# Patient Record
Sex: FEMALE | Race: WHITE | Employment: FULL TIME | URBAN - METROPOLITAN AREA
[De-identification: names, ages, dates, MRNs, and addresses within clinical notes are randomized per-mention and may not be internally consistent; named-entity substitution may affect disease eponyms.]

---

## 2021-03-24 ENCOUNTER — HISTORICAL (OUTPATIENT)
Dept: ADMINISTRATIVE | Facility: HOSPITAL | Age: 46
End: 2021-03-24

## 2021-03-24 LAB
ABS NEUT (OLG): 4.48 X10(3)/MCL (ref 2.1–9.2)
ALBUMIN SERPL-MCNC: 4.2 GM/DL (ref 3.5–5)
ALBUMIN/GLOB SERPL: 1.3 RATIO (ref 1.1–2)
ALP SERPL-CCNC: 115 UNIT/L (ref 40–150)
ALT SERPL-CCNC: 14 UNIT/L (ref 0–55)
AST SERPL-CCNC: 21 UNIT/L (ref 5–34)
BASOPHILS # BLD AUTO: 0.1 X10(3)/MCL (ref 0–0.2)
BASOPHILS NFR BLD AUTO: 1 %
BILIRUB SERPL-MCNC: 0.4 MG/DL
BILIRUBIN DIRECT+TOT PNL SERPL-MCNC: 0.1 MG/DL (ref 0–0.5)
BILIRUBIN DIRECT+TOT PNL SERPL-MCNC: 0.3 MG/DL (ref 0–0.8)
BUN SERPL-MCNC: 7.7 MG/DL (ref 7–18.7)
CALCIUM SERPL-MCNC: 9.6 MG/DL (ref 8.4–10.2)
CHLORIDE SERPL-SCNC: 104 MMOL/L (ref 98–107)
CO2 SERPL-SCNC: 28 MMOL/L (ref 22–29)
CREAT SERPL-MCNC: 0.84 MG/DL (ref 0.55–1.02)
EOSINOPHIL # BLD AUTO: 0.2 X10(3)/MCL (ref 0–0.9)
EOSINOPHIL NFR BLD AUTO: 3 %
ERYTHROCYTE [DISTWIDTH] IN BLOOD BY AUTOMATED COUNT: 13 % (ref 11.5–17)
GLOBULIN SER-MCNC: 3.2 GM/DL (ref 2.4–3.5)
GLUCOSE SERPL-MCNC: 83 MG/DL (ref 74–100)
HCT VFR BLD AUTO: 44.2 % (ref 37–47)
HGB BLD-MCNC: 14.4 GM/DL (ref 12–16)
LYMPHOCYTES # BLD AUTO: 1.2 X10(3)/MCL (ref 0.6–4.6)
LYMPHOCYTES NFR BLD AUTO: 18 %
MCH RBC QN AUTO: 30.5 PG (ref 27–31)
MCHC RBC AUTO-ENTMCNC: 32.6 GM/DL (ref 33–36)
MCV RBC AUTO: 93.6 FL (ref 80–94)
MONOCYTES # BLD AUTO: 0.6 X10(3)/MCL (ref 0.1–1.3)
MONOCYTES NFR BLD AUTO: 9 %
NEUTROPHILS # BLD AUTO: 4.48 X10(3)/MCL (ref 2.1–9.2)
NEUTROPHILS NFR BLD AUTO: 69 %
PLATELET # BLD AUTO: 392 X10(3)/MCL (ref 130–400)
PMV BLD AUTO: 9.9 FL (ref 9.4–12.4)
POTASSIUM SERPL-SCNC: 4.8 MMOL/L (ref 3.5–5.1)
PROT SERPL-MCNC: 7.4 GM/DL (ref 6.4–8.3)
RBC # BLD AUTO: 4.72 X10(6)/MCL (ref 4.2–5.4)
SODIUM SERPL-SCNC: 140 MMOL/L (ref 136–145)
WBC # SPEC AUTO: 6.5 X10(3)/MCL (ref 4.5–11.5)

## 2021-04-09 ENCOUNTER — HISTORICAL (OUTPATIENT)
Dept: RADIOLOGY | Facility: HOSPITAL | Age: 46
End: 2021-04-09

## 2021-04-09 LAB — SARS-COV-2 RNA RESP QL NAA+PROBE: NOT DETECTED

## 2021-04-14 ENCOUNTER — HISTORICAL (OUTPATIENT)
Dept: SURGERY | Facility: HOSPITAL | Age: 46
End: 2021-04-14

## 2021-04-28 ENCOUNTER — HISTORICAL (OUTPATIENT)
Dept: ADMINISTRATIVE | Facility: HOSPITAL | Age: 46
End: 2021-04-28

## 2021-05-02 LAB — EST CREAT CLEARANCE SER (OHS): 95.29 ML/MIN

## 2021-07-13 ENCOUNTER — HISTORICAL (OUTPATIENT)
Dept: SURGERY | Facility: HOSPITAL | Age: 46
End: 2021-07-13

## 2021-07-13 LAB — POC BETA-HCG (QUAL): NEGATIVE

## 2021-07-14 LAB — GRAM STN SPEC: NORMAL

## 2021-07-17 LAB — FINAL CULTURE: NORMAL

## 2021-10-11 ENCOUNTER — OFFICE VISIT (OUTPATIENT)
Dept: PLASTIC SURGERY | Facility: CLINIC | Age: 46
End: 2021-10-11
Payer: MEDICAID

## 2021-10-11 VITALS
DIASTOLIC BLOOD PRESSURE: 63 MMHG | BODY MASS INDEX: 23.55 KG/M2 | SYSTOLIC BLOOD PRESSURE: 111 MMHG | HEART RATE: 75 BPM | HEIGHT: 69 IN | WEIGHT: 159 LBS

## 2021-10-11 DIAGNOSIS — C50.112 MALIGNANT NEOPLASM OF CENTRAL PORTION OF LEFT BREAST IN FEMALE, ESTROGEN RECEPTOR POSITIVE: ICD-10-CM

## 2021-10-11 DIAGNOSIS — Z85.3 HISTORY OF LEFT BREAST CANCER: Primary | ICD-10-CM

## 2021-10-11 DIAGNOSIS — Z17.0 MALIGNANT NEOPLASM OF CENTRAL PORTION OF LEFT BREAST IN FEMALE, ESTROGEN RECEPTOR POSITIVE: ICD-10-CM

## 2021-10-11 PROCEDURE — 99204 OFFICE O/P NEW MOD 45 MIN: CPT | Mod: S$GLB,,, | Performed by: SURGERY

## 2021-10-11 PROCEDURE — 99204 PR OFFICE/OUTPT VISIT, NEW, LEVL IV, 45-59 MIN: ICD-10-PCS | Mod: S$GLB,,, | Performed by: SURGERY

## 2021-10-11 RX ORDER — CETIRIZINE HYDROCHLORIDE 10 MG/1
10 TABLET ORAL
COMMUNITY

## 2021-10-11 RX ORDER — TEMAZEPAM 15 MG/1
CAPSULE ORAL
COMMUNITY
Start: 2021-10-05

## 2021-10-11 RX ORDER — MONTELUKAST SODIUM 10 MG/1
10 TABLET ORAL
COMMUNITY

## 2021-10-11 RX ORDER — ANASTROZOLE 1 MG/1
1 TABLET ORAL DAILY
COMMUNITY
Start: 2021-10-01

## 2021-10-12 ENCOUNTER — TELEPHONE (OUTPATIENT)
Dept: PLASTIC SURGERY | Facility: CLINIC | Age: 46
End: 2021-10-12

## 2021-10-12 DIAGNOSIS — C50.112 MALIGNANT NEOPLASM OF CENTRAL PORTION OF LEFT BREAST IN FEMALE, ESTROGEN RECEPTOR POSITIVE: ICD-10-CM

## 2021-10-12 DIAGNOSIS — Z90.13 HISTORY OF BILATERAL MASTECTOMY: Primary | ICD-10-CM

## 2021-10-12 DIAGNOSIS — Z17.0 MALIGNANT NEOPLASM OF CENTRAL PORTION OF LEFT BREAST IN FEMALE, ESTROGEN RECEPTOR POSITIVE: ICD-10-CM

## 2021-10-13 ENCOUNTER — DOCUMENTATION ONLY (OUTPATIENT)
Dept: PLASTIC SURGERY | Facility: CLINIC | Age: 46
End: 2021-10-13

## 2021-10-27 ENCOUNTER — PATIENT MESSAGE (OUTPATIENT)
Dept: PLASTIC SURGERY | Facility: CLINIC | Age: 46
End: 2021-10-27
Payer: MEDICAID

## 2021-12-14 ENCOUNTER — HISTORICAL (OUTPATIENT)
Dept: RADIOLOGY | Facility: HOSPITAL | Age: 46
End: 2021-12-14

## 2022-04-10 ENCOUNTER — HISTORICAL (OUTPATIENT)
Dept: ADMINISTRATIVE | Facility: HOSPITAL | Age: 47
End: 2022-04-10
Payer: MEDICAID

## 2022-04-25 VITALS
SYSTOLIC BLOOD PRESSURE: 126 MMHG | BODY MASS INDEX: 24 KG/M2 | DIASTOLIC BLOOD PRESSURE: 78 MMHG | HEIGHT: 69 IN | WEIGHT: 162.06 LBS

## 2022-04-27 ENCOUNTER — TELEPHONE (OUTPATIENT)
Dept: PLASTIC SURGERY | Facility: CLINIC | Age: 47
End: 2022-04-27
Payer: MEDICAID

## 2022-04-27 NOTE — TELEPHONE ENCOUNTER
Spoke w/pt and she states her latest sx was on 2/9. Pt instructed we will have to wait out the 90 day global period so we cannot see her until 5/9. Appt scheduled. Pt concerned w/wounds that are not closing and wanting to get in sooner if possible. I informed pt I would speak w/Dr Garner regarding this and get back w/her tomorrow once Dr Garner is out of surgery.

## 2022-04-27 NOTE — TELEPHONE ENCOUNTER
----- Message from Carline Blanco sent at 4/27/2022  8:40 AM CDT -----  Type:  Patient Returning Call    Who Called:Kasia Rosales    Who Left Message for Patient: office   Does the patient know what this is regarding?: referral   Would the patient rather a call back or a response via [employer]?    Best Call Back Number: [phone] ( work)   Additional Information: returning a call

## 2022-04-28 NOTE — TELEPHONE ENCOUNTER
Pt informed that we will have to wait until 5/9 once she is out of her global period. We will also email her a records release to sign so we can get records from Dr Dar Mckeon in [place].

## 2022-04-30 NOTE — H&P
Patient:   Kasia Rosales            MRN: [id number]            FIN: [id number]               Age:   45 years     Sex:  Female     :  1975   Associated Diagnoses:   None   Author:   Livia Florence      Health Status   The H&P was reviewed, the patient was examined, and there are no changes to the patient's condition..

## 2022-04-30 NOTE — OP NOTE
DATE OF SURGERY:    04/14/2021    SURGEON:  Manav Paz MD  ASSISTANT:  WILLI Walker    PREOPERATIVE DIAGNOSIS:  Left breast invasive ductal carcinoma, 11 o'clock position.    POSTOPERATIVE DIAGNOSIS:  Left breast invasive ductal carcinoma, 11 o'clock position.    PROCEDURES:    1. Bilateral nipple-sparing mastectomies.  2. Left deep axillary sentinel lymph node biopsy.  3. Left axillary sentinel lymph node mapping with Neoprobe.  4. Injection of 1 mCi of radioactive sulfur colloid for sentinel lymph node mapping with Neoprobe.    ANESTHESIA:  General endotracheal anesthesia.    ESTIMATED BLOOD LOSS:  Less than 15 cc.    INTRAOPERATIVE FINDINGS:  Bilateral nipple-sparing mastectomies were performed through the inframammary fold incisions.  Three sentinel lymph nodes were identified, which were hot with maximum counts in the range of 1800.  No evidence of metastatic carcinoma was identified on frozen section.  No evidence of tumor was identified at the frozen section of the nipple margin, deferred to permanent section.    PROCEDURE IN DETAIL:  After informed consent was obtained, patient was brought to the operating room, placed supine position.  General endotracheal anesthesia was administered without difficulty.  Patient's bilateral breast and axilla were prepped and draped in a sterile fashion.  Preoperatively, I injected 1 mCi of radioactive sulfur colloid in the areolar dermis of the left breast for sentinel lymph node mapping and Neoprobe.  Beginning on the right, inframammary fold incision was made, carried down to the subcutaneous tissues.  The breast was then elevated off the pectoralis completely, medially to the level of the sternum with care to avoid the perforators, inferiorly to the inframammary crease, lateral to the latissimus dorsi, and superiorly to the 2nd rib.  Meticulous dissection was then carried out between the breast capsule and the subcutaneous tissue beneath the nipple.   Meticulous dissection was carried out using sharp dissection, divided with Metzenbaum scissors, dividing the ducts as they entered the nipple-areolar complex dermis.  Specimen was marked with suture long lateral and short superior and suture clips at the nipple margin.  Frozen section showed no evidence of disease at the nipple margin.  Meticulous hemostasis was achieved.  Attention was turned toward the left inframammary fold.  Incision was made carried, down to the subcutaneous tissues.  The breast was elevated off the pectoralis circumferentially and completely, medially to the sternum, inferiorly to the inframammary crease, laterally to latissimus dorsi, and superiorly to the 2nd rib.  Meticulous dissection was then carried out between the breast capsule and the subcutaneous tissue beneath the nipple.  Sharp dissection was carried out using Metzenbaum scissors, dividing the ducts as they entered the dermis.  Specimen marked with suture long lateral, short superior, and sent for histopathological evaluation.  Frozen section showed no definitive evidence of carcinoma at the nipple margin.  Deferred to primary section.  A small incision was made in the axilla using the Neoprobe.  Three deep axillary sentinel nodes were identified, which were hot.  They were completely excised with entering lymphatics divided between clips and incidental axillary tissue also submitted.  Frozen section showed no evidence of metastatic carcinoma.  Meticulous hemostasis was achieved.  Dr. Mckeon then entered the procedure to perform the 1st stage of reconstruction.  Please see the operative note for details.        ______________________________  MD ANDRAE Hines/FRANKIE  DD:  04/28/2021  Time:  07:19AM  DT:  04/28/2021  Time:  07:36AM  Job #:  [id number]

## 2022-04-30 NOTE — OP NOTE
DATE OF SURGERY:    04/28/2021    SURGEON:  Dar Mckeon MD    PREOPERATIVE DIAGNOSIS:  Left-sided breast cancer with positive nipple-areolar complex margin.    POSTOPERATIVE DIAGNOSIS:  Left-sided breast cancer with positive nipple-areolar complex margin.    PROCEDURE:  Bilateral resection of nipple-areolar complexes and primary closure.    INDICATIONS FOR PROCEDURE:  Kasia Rosales is a 45-year-old female with a history of left-sided breast cancer treated with mastectomy with positive nipple-areolar complex margin.  She presents for bilateral removal for symmetry.    ANESTHESIA:  General.    COMPLICATIONS:  None.    PROCEDURE IN DETAIL:  The patient was endotracheally intubated, and prepped and draped in the usual sterile fashion.  An elliptical incision was made around the left-sided nipple-areolar complex.  This was removed in its entirety full thickness of the skin.  This was marked and sent to Pathology.       We then performed a similar excision on the right side using a 15 blade scalp, removing the entirety of the entirety of the nipple-areolar complex in an ellipse and this were also sent to Pathology.  We closed the deep tissues using interrupted 3-0 Vicryl suture and a 4-0 Monocryl was used to close the skin.  There were no complications.       I was scrubbed and present for the entire procedure.        ______________________________  MD HEIDI Pryor/UEB  DD:  04/28/2021  Time:  03:01PM  DT:  04/28/2021  Time:  03:17PM  Job #:  [id number]

## 2022-05-03 NOTE — HISTORICAL [facility] CERNER
This is a historical note converted from [facility]. Formatting and pictures may have been removed.  Please reference [facility] for original formatting and attached multimedia. Indication for Surgery  This is a 45-year-old female?that is status post bilateral mastectomies?and tissue expander?based reconstruction.??She presented to the office yesterday?with exposure of the left breast tissue expander?and an infected seroma.??Options were discussed with her and she elected to proceed with?removal of the expander.  Preoperative Diagnosis  Infected left?breast tissue expander  Postoperative Diagnosis  Same  Operation  1.??Irrigation and debridement left breast  2.??Removal of left breast tissue expander and acellular dermal matrix  Surgeon(s)  Chang Lopez  Anesthesia  General  Estimated Blood Loss  Minimal  Findings  Purulent fluid cultured  Specimen(s)  Left breast fluid  Complications  None  Technique  Patient was identified in the preoperative holding area.??Informed consent was reviewed.??She was taken to the operating room?and general tracheal anesthesia provided.??I opened her left breast?IMF incision?and excised the area that was open?with a 10 blade scalpel.??The skin was passed off the field as a specimen.??Left breast tissue expander was readily identified.??This was removed.??There is no to be?copious amount of purulent appearing fluid within the wound bed?this was cultured x2.??The wound was then irrigated with 4 L of antibiotic impregnated saline.??Please note that the previous AlloDerm?was moderately incorporated but not completely incorporated I elected to remove it?as well.??15 round Lincoln drain was placed within the wound bed exited inferior laterally?secured to the skin with 3-0 silk suture.??The wound was then closed with 0 Vicryl sutures in interrupted fashion?and 2-0 subcuticular Monocryl.??Sterile dressings were applied.??There were no complications.

## 2022-05-09 ENCOUNTER — OFFICE VISIT (OUTPATIENT)
Dept: PLASTIC SURGERY | Facility: CLINIC | Age: 47
End: 2022-05-09
Payer: MEDICAID

## 2022-05-09 VITALS
BODY MASS INDEX: 22.81 KG/M2 | WEIGHT: 154 LBS | SYSTOLIC BLOOD PRESSURE: 121 MMHG | HEART RATE: 72 BPM | DIASTOLIC BLOOD PRESSURE: 68 MMHG | OXYGEN SATURATION: 98 % | HEIGHT: 69 IN

## 2022-05-09 DIAGNOSIS — R10.30 LOWER ABDOMINAL PAIN: Primary | ICD-10-CM

## 2022-05-09 DIAGNOSIS — Z42.8 ENCOUNTER FOR OTHER PLASTIC AND RECONSTRUCTIVE SURGERY FOLLOWING MEDICAL PROCEDURE OR HEALED INJURY: ICD-10-CM

## 2022-05-09 DIAGNOSIS — Z90.13 HISTORY OF BILATERAL MASTECTOMY: ICD-10-CM

## 2022-05-09 PROCEDURE — 3078F PR MOST RECENT DIASTOLIC BLOOD PRESSURE < 80 MM HG: ICD-10-PCS | Mod: CPTII,S$GLB,, | Performed by: SURGERY

## 2022-05-09 PROCEDURE — 1159F PR MEDICATION LIST DOCUMENTED IN MEDICAL RECORD: ICD-10-PCS | Mod: CPTII,S$GLB,, | Performed by: SURGERY

## 2022-05-09 PROCEDURE — 3008F PR BODY MASS INDEX (BMI) DOCUMENTED: ICD-10-PCS | Mod: CPTII,S$GLB,, | Performed by: SURGERY

## 2022-05-09 PROCEDURE — 3008F BODY MASS INDEX DOCD: CPT | Mod: CPTII,S$GLB,, | Performed by: SURGERY

## 2022-05-09 PROCEDURE — 1159F MED LIST DOCD IN RCRD: CPT | Mod: CPTII,S$GLB,, | Performed by: SURGERY

## 2022-05-09 PROCEDURE — 3074F PR MOST RECENT SYSTOLIC BLOOD PRESSURE < 130 MM HG: ICD-10-PCS | Mod: CPTII,S$GLB,, | Performed by: SURGERY

## 2022-05-09 PROCEDURE — 99214 OFFICE O/P EST MOD 30 MIN: CPT | Mod: S$GLB,,, | Performed by: SURGERY

## 2022-05-09 PROCEDURE — 3078F DIAST BP <80 MM HG: CPT | Mod: CPTII,S$GLB,, | Performed by: SURGERY

## 2022-05-09 PROCEDURE — 3074F SYST BP LT 130 MM HG: CPT | Mod: CPTII,S$GLB,, | Performed by: SURGERY

## 2022-05-09 PROCEDURE — 99214 PR OFFICE/OUTPT VISIT, EST, LEVL IV, 30-39 MIN: ICD-10-PCS | Mod: S$GLB,,, | Performed by: SURGERY

## 2022-05-09 NOTE — PROGRESS NOTES
REFERRAL FOR BREAST RECONSTRUCTION     CHIEF COMPLAINT  Reconstruction after Bilateral Mastectomy and failed tissue expander       Referring Provider: Dr. Celis  PCP Melisa Dotson     HPI  Kasia Rosales is a 45 y.o. female presenting with left breast cancer for evaluation of reconstruction after failed tissue expander.  She is also status post adjuvant chemotherapy, last treatment was August 25th.  She had negative lymph nodes and no radiation was necessary.       The patient had a lumpectomy in 2014 the records for this are not available to me.  She subsequently developed left breast cancer and underwent a bilateral simple mastectomy and left sentinel lymph node biopsy with reconstruction involving tissue expander through an IMF incision.  Patient had nipple loss and required a separate operation and removal of an infected right tissue expander.  She had a port placed for chemo and received several treatments.  She then developed a seroma and required removal of that expander on the left.  She has some overall discomfort at her port site in the right chest however this is resolved after removal. Resumed chemo after her last surgery. Finished chemo 8/25/2021 and then started reconstruction process again.  Surgery was Feb 9th with Dr. Mckeon. She is 4 months out and still not healed . She has areas that are reopening and is not happy with the current results.   Her oncologist suggested looking for a second opinion. Still waiting to schedule her hysterectomy. She had multiple physicians see her wounds and have suggested she needs a second opinion since she was not getting much help from her original surgeon.      She desires a B size cup.             PMH  Past Medical History:   Diagnosis Date    Breast cancer 03/10/2020    Stress incontinence         PSH        Past Surgical History:   Procedure Laterality Date    BREAST LUMPECTOMY Left 2014    fatty tissue removal        KNEE SURGERY Right       MASTECTOMY   04/2021    MEDIPORT INSERTION, SINGLE   2020    mediport remov   2021    neck fusion         three level fusion     shoulder surgery Right      TISSUE EXPANDER REMOVAL Right 05/2021    TISSUE EXPANDER REMOVAL Left 06/2021    WRIST SURGERY Right           FH        Family History   Problem Relation Age of Onset    Breast cancer Mother      Hypertension Mother      Diabetes Mother      Hypertension Father      Diabetes Father      BRCA 1/2 Sister      Stroke Sister      Breast cancer Maternal Aunt      Breast cancer Maternal Aunt      Breast cancer Paternal Aunt      Cervical cancer Maternal Aunt      Ovarian cancer Maternal Aunt      Liver cancer Sister           MEDICATIONS         Outpatient Medications Marked as Taking for the 10/11/21 encounter (Office Visit) with Lakisha Garner MD   Medication Sig Dispense Refill    anastrozole (ARIMIDEX) 1 mg Tab Take 1 mg by mouth once daily.        cetirizine (ZYRTEC) 10 MG tablet Take 10 mg by mouth.        montelukast (SINGULAIR) 10 mg tablet Take 10 mg by mouth.             ALLERGIES       Review of patient's allergies indicates:   Allergen Reactions    Sumatriptan succinate Anaphylaxis         SOCIAL HISTORY  Tobacco:   Social History          Tobacco Use   Smoking Status Never Smoker   Smokeless Tobacco Never Used      EtOH:   Social History           Substance and Sexual Activity   Alcohol Use Not Currently     Comment: occassionally          ROS  Review of Systems   Constitutional: Negative for chills and fever.   HENT: Negative for congestion.    Eyes: Negative for blurred vision and double vision.   Respiratory: Negative for cough and shortness of breath.    Cardiovascular: Negative for chest pain and palpitations.   Gastrointestinal: Negative for abdominal pain, nausea and vomiting.   Genitourinary: Negative for dysuria and frequency.   Musculoskeletal: Negative for back pain and neck pain.   Skin: Negative for itching and rash.  "  Neurological: Negative for dizziness, seizures and headaches.   Endo/Heme/Allergies: Does not bruise/bleed easily.   Psychiatric/Behavioral: Negative for depression and substance abuse.            PHYSICAL EXAM  Vitals:    05/09/22 1422   BP: 121/68   Pulse: 72   SpO2: 98%   Weight: 69.9 kg (154 lb)   Height: 5' 9" (1.753 m)        Constitutional: She is oriented to person, place, and time. She appears well-developed and well-nourished.   HENT: Normocephalic and atraumatic.   Neck: Normal range of motion. Neck supple. No JVD present.   Cardiovascular: Normal rate, regular rhythm and normal heart sounds.    Pulmonary/Chest: Effort normal. No respiratory distress.   Musculoskeletal: Normal range of motion. She exhibits no edema or deformity.   Neurological: She is alert and oriented to person, place, and time. No sensory deficit. She exhibits normal muscle tone.   Skin: Skin is warm. No rash noted. No erythema.   Psychiatric: She has a normal mood and affect. Her behavior is normal.      Bilateral mastectomy scars noted, on the left there are 3 parallel transverse incisions and on the right there are 2.  Port site on the right.  No masses, nodules, axillary adenopathy detected.  No skin changes.     Back  - fat pad 2 cm  - latissimus functional     Abdomen/Trunk/Thigh/Buttock  Abdomen: soft, nontender, nondistended, hernias absent, minimal diastasis  Fat excess in hypogastrium                           ASSESSMENT       Encounter Diagnoses   Name Primary?    History of left breast cancer Yes    Malignant neoplasm of central portion of left breast in female, estrogen receptor positive             REVISION  Secondary revision surgery including autogenous fat grafting and nipple areolar reconstruction was reviewed.     Autogenous fat grafts might be necessary to improve skin thickness and enhance symmetric chest wall and breast mound symmetry and contour at a subsequent reconstructive procedure. Possible adverse " outcomes, risks, and complications of fat grafting discussed include but are not limited to: Fat necrosis with a lumps, bleeding, infection, insufficient or excessive change in the size or shape, unevenness in the area grafted or donor sites, poor wound healing, inability to achieve desired cosmetic outcome, need for further interventions or surgery,iImplant puncture, venous thromboembolism     NIPPLE AREOLAR RECONSTRUCTION  Nipple reconstruction may be performed in 2 steps.      Risks of tattoo reviewed: pigment loss or irregularity, infection, slow healing, loss of implant (from infection), need for repeat tattooing     Risks of nipple mound creation reviewed:  Nipple flattening, asymmetry, fading tattoo pigment, poor cosmetic outcome, need for further surgery, bleeding, infection, poor scarring, wound separation, failure to heal, pain, need for further surgery, loss of implant from infection or wound separation     ---------------------------------     PLAN  Rectus diastasis present before and after surgery, may not have done muscle repair or taken muscle with donor site, unclear. Will get op notes.   Continue to heal incision, possible mesh re-enforcement/repair from inside if defect present, will get CT abdomen. Can consider PT to strengthen muscle.  Garment for comfort for now.   Apply Bacitracin twice a day and Aquaphor as well as using a binder during the day. Start using scar gel.   Wants a second/ third stage with implants once completely healed.  Would not pursue this until all wounds are healed for 3 months.

## 2022-05-12 ENCOUNTER — TELEPHONE (OUTPATIENT)
Dept: PLASTIC SURGERY | Facility: CLINIC | Age: 47
End: 2022-05-12
Payer: MEDICAID

## 2022-05-12 RX ORDER — BACITRACIN 500 [USP'U]/G
OINTMENT TOPICAL 3 TIMES DAILY
Qty: 14 G | Refills: 2 | Status: SHIPPED | OUTPATIENT
Start: 2022-05-12 | End: 2022-06-01

## 2022-05-12 NOTE — TELEPHONE ENCOUNTER
----- Message from Tammie Baires sent at 5/12/2022  1:52 PM CDT -----  Regarding: pt advice  Contact: Pt  Pt is calling to see if medical records was received, checking status on referral for PT. States that no one has called her. Also wants ask about a compression belt. Please call back at [phone] before 5pm or [phone] after 5pm//thank you acc

## 2022-05-12 NOTE — TELEPHONE ENCOUNTER
Spoke w/pt and informed her we did receive med recs, orders for PT were sent to [facility] and to give them a couple days to contact her, CT ordered and number given for her to call and schedule and pt will come by and pickup binder.

## 2022-05-24 ENCOUNTER — TELEPHONE (OUTPATIENT)
Dept: PLASTIC SURGERY | Facility: CLINIC | Age: 47
End: 2022-05-24
Payer: MEDICAID

## 2022-05-24 NOTE — TELEPHONE ENCOUNTER
Spoke with patient. We have to CT results but waiting for Dr. Garner to review them. Pt scheduled to come in Thursday to discuss results and decide what the next step will be

## 2022-05-24 NOTE — TELEPHONE ENCOUNTER
----- Message from Yancy Palma sent at 5/23/2022  4:20 PM CDT -----  Regarding: CT scan  This patient called wanting to talk about her CT scan I told her you would give her a call back when you were done with patients.     If you call her before 5:00 she said to call [phone] or her cell phone.

## 2022-05-26 ENCOUNTER — OFFICE VISIT (OUTPATIENT)
Dept: PLASTIC SURGERY | Facility: CLINIC | Age: 47
End: 2022-05-26
Payer: MEDICAID

## 2022-05-26 VITALS
SYSTOLIC BLOOD PRESSURE: 108 MMHG | BODY MASS INDEX: 22.81 KG/M2 | WEIGHT: 154 LBS | HEIGHT: 69 IN | OXYGEN SATURATION: 98 % | DIASTOLIC BLOOD PRESSURE: 58 MMHG | HEART RATE: 76 BPM

## 2022-05-26 DIAGNOSIS — Z85.3 HISTORY OF LEFT BREAST CANCER: Primary | ICD-10-CM

## 2022-05-26 DIAGNOSIS — Z98.890 HISTORY OF RECONSTRUCTION OF BOTH BREASTS: ICD-10-CM

## 2022-05-26 DIAGNOSIS — Z90.13 HISTORY OF BILATERAL MASTECTOMY: ICD-10-CM

## 2022-05-26 PROCEDURE — 1159F PR MEDICATION LIST DOCUMENTED IN MEDICAL RECORD: ICD-10-PCS | Mod: CPTII,S$GLB,, | Performed by: SURGERY

## 2022-05-26 PROCEDURE — 1159F MED LIST DOCD IN RCRD: CPT | Mod: CPTII,S$GLB,, | Performed by: SURGERY

## 2022-05-26 PROCEDURE — 3008F BODY MASS INDEX DOCD: CPT | Mod: CPTII,S$GLB,, | Performed by: SURGERY

## 2022-05-26 PROCEDURE — 3074F SYST BP LT 130 MM HG: CPT | Mod: CPTII,S$GLB,, | Performed by: SURGERY

## 2022-05-26 PROCEDURE — 3008F PR BODY MASS INDEX (BMI) DOCUMENTED: ICD-10-PCS | Mod: CPTII,S$GLB,, | Performed by: SURGERY

## 2022-05-26 PROCEDURE — 99213 PR OFFICE/OUTPT VISIT, EST, LEVL III, 20-29 MIN: ICD-10-PCS | Mod: S$GLB,,, | Performed by: SURGERY

## 2022-05-26 PROCEDURE — 3074F PR MOST RECENT SYSTOLIC BLOOD PRESSURE < 130 MM HG: ICD-10-PCS | Mod: CPTII,S$GLB,, | Performed by: SURGERY

## 2022-05-26 PROCEDURE — 3078F PR MOST RECENT DIASTOLIC BLOOD PRESSURE < 80 MM HG: ICD-10-PCS | Mod: CPTII,S$GLB,, | Performed by: SURGERY

## 2022-05-26 PROCEDURE — 99213 OFFICE O/P EST LOW 20 MIN: CPT | Mod: S$GLB,,, | Performed by: SURGERY

## 2022-05-26 PROCEDURE — 3078F DIAST BP <80 MM HG: CPT | Mod: CPTII,S$GLB,, | Performed by: SURGERY

## 2022-05-26 NOTE — PROGRESS NOTES
REFERRAL FOR BREAST RECONSTRUCTION     CHIEF COMPLAINT  Reconstruction after Bilateral Mastectomy and failed tissue expander       Referring Provider: Dr. Celis  PCP Melisa Dotson     HPI  Kasia Rosales is a 45 y.o. female presenting with left breast cancer for evaluation of reconstruction after failed tissue expander.  She is also status post adjuvant chemotherapy, last treatment was August 25th.  She had negative lymph nodes and no radiation was necessary.       The patient had a lumpectomy in 2014 the records for this are not available to me.  She subsequently developed left breast cancer and underwent a bilateral simple mastectomy and left sentinel lymph node biopsy with reconstruction involving tissue expander through an IMF incision.  Patient had nipple loss and required a separate operation and removal of an infected right tissue expander.  She had a port placed for chemo and received several treatments.  She then developed a seroma and required removal of that expander on the left.  She has some overall discomfort at her port site in the right chest however this is resolved after removal. Resumed chemo after her last surgery. Finished chemo 8/25/2021 and then started reconstruction process again.  Surgery was Feb 9th with Dr. Mckeon. She is 4 months out and still not healed . She has areas that are reopening and is not happy with the current results.   Her oncologist suggested looking for a second opinion. Still waiting to schedule her hysterectomy. She had multiple physicians see her wounds and have suggested she needs a second opinion since she was not getting much help from her original surgeon.      She desires a B size cup.             PMH  Past Medical History:   Diagnosis Date    Breast cancer 03/10/2020    Stress incontinence         PSH        Past Surgical History:   Procedure Laterality Date    BREAST LUMPECTOMY Left 2014    fatty tissue removal        KNEE SURGERY Right       MASTECTOMY   04/2021    MEDIPORT INSERTION, SINGLE   2020    mediport remov   2021    neck fusion         three level fusion     shoulder surgery Right      TISSUE EXPANDER REMOVAL Right 05/2021    TISSUE EXPANDER REMOVAL Left 06/2021    WRIST SURGERY Right           FH        Family History   Problem Relation Age of Onset    Breast cancer Mother      Hypertension Mother      Diabetes Mother      Hypertension Father      Diabetes Father      BRCA 1/2 Sister      Stroke Sister      Breast cancer Maternal Aunt      Breast cancer Maternal Aunt      Breast cancer Paternal Aunt      Cervical cancer Maternal Aunt      Ovarian cancer Maternal Aunt      Liver cancer Sister           MEDICATIONS         Outpatient Medications Marked as Taking for the 10/11/21 encounter (Office Visit) with Lakisha Garner MD   Medication Sig Dispense Refill    anastrozole (ARIMIDEX) 1 mg Tab Take 1 mg by mouth once daily.        cetirizine (ZYRTEC) 10 MG tablet Take 10 mg by mouth.        montelukast (SINGULAIR) 10 mg tablet Take 10 mg by mouth.             ALLERGIES       Review of patient's allergies indicates:   Allergen Reactions    Sumatriptan succinate Anaphylaxis         SOCIAL HISTORY  Tobacco:   Social History          Tobacco Use   Smoking Status Never Smoker   Smokeless Tobacco Never Used      EtOH:   Social History           Substance and Sexual Activity   Alcohol Use Not Currently     Comment: occassionally          ROS  Review of Systems   Constitutional: Negative for chills and fever.   HENT: Negative for congestion.    Eyes: Negative for blurred vision and double vision.   Respiratory: Negative for cough and shortness of breath.    Cardiovascular: Negative for chest pain and palpitations.   Gastrointestinal: Negative for abdominal pain, nausea and vomiting.   Genitourinary: Negative for dysuria and frequency.   Musculoskeletal: Negative for back pain and neck pain.   Skin: Negative for itching and rash.  "  Neurological: Negative for dizziness, seizures and headaches.   Endo/Heme/Allergies: Does not bruise/bleed easily.   Psychiatric/Behavioral: Negative for depression and substance abuse.            PHYSICAL EXAM  Vitals:    05/26/22 1135   BP: (!) 108/58   Pulse: 76   SpO2: 98%   Weight: 69.9 kg (154 lb)   Height: 5' 9" (1.753 m)        Constitutional: She is oriented to person, place, and time. She appears well-developed and well-nourished.   HENT: Normocephalic and atraumatic.   Neck: Normal range of motion. Neck supple. No JVD present.   Cardiovascular: Normal rate, regular rhythm and normal heart sounds.    Pulmonary/Chest: Effort normal. No respiratory distress.   Musculoskeletal: Normal range of motion. She exhibits no edema or deformity.   Neurological: She is alert and oriented to person, place, and time. No sensory deficit. She exhibits normal muscle tone.   Skin: Skin is warm. No rash noted. No erythema.   Psychiatric: She has a normal mood and affect. Her behavior is normal.      Bilateral mastectomy scars noted, on the left there are 3 parallel transverse incisions and on the right there are 2.  Port site on the right.  No masses, nodules, axillary adenopathy detected.  No skin changes.     Back  - fat pad 2 cm  - latissimus functional     Abdomen/Trunk/Thigh/Buttock  Abdomen: soft, nontender, nondistended, hernias absent, minimal diastasis  Fat excess in hypogastrium                           ASSESSMENT       Encounter Diagnoses   Name Primary?    History of left breast cancer Yes    Malignant neoplasm of central portion of left breast in female, estrogen receptor positive             REVISION  Secondary revision surgery including autogenous fat grafting and nipple areolar reconstruction was reviewed.     Autogenous fat grafts might be necessary to improve skin thickness and enhance symmetric chest wall and breast mound symmetry and contour at a subsequent reconstructive procedure. Possible adverse " outcomes, risks, and complications of fat grafting discussed include but are not limited to: Fat necrosis with a lumps, bleeding, infection, insufficient or excessive change in the size or shape, unevenness in the area grafted or donor sites, poor wound healing, inability to achieve desired cosmetic outcome, need for further interventions or surgery,iImplant puncture, venous thromboembolism     NIPPLE AREOLAR RECONSTRUCTION  Nipple reconstruction may be performed in 2 steps.      Risks of tattoo reviewed: pigment loss or irregularity, infection, slow healing, loss of implant (from infection), need for repeat tattooing     Risks of nipple mound creation reviewed:  Nipple flattening, asymmetry, fading tattoo pigment, poor cosmetic outcome, need for further surgery, bleeding, infection, poor scarring, wound separation, failure to heal, pain, need for further surgery, loss of implant from infection or wound separation     ---------------------------------     PLAN     5/26/22 pt presents today for follow up and CT review. Pt informed that the CT did not show any hernias. Pt was shown before and after pictures of her belly muscle and does not appear that any rectus plication was performed. Pt informed revising that scar and doing a full on abdominoplasty would be hard to get incision to go lower and may cause her more problems such as wounds. Another alternative would be some PT x6 mths and pilates to tighten and pull in muscle. Skin laxity at that time might allow for some scar revision.  Pt informed we can do breast reconstruction at same time as abdominal surgery down the road.

## 2022-05-31 PROBLEM — Z98.890 HISTORY OF RECONSTRUCTION OF BOTH BREASTS: Status: ACTIVE | Noted: 2022-05-31

## 2022-05-31 PROBLEM — Z90.13 HISTORY OF BILATERAL MASTECTOMY: Status: ACTIVE | Noted: 2022-05-31

## 2022-09-08 ENCOUNTER — DOCUMENTATION ONLY (OUTPATIENT)
Dept: SURGICAL ONCOLOGY | Facility: CLINIC | Age: 47
End: 2022-09-08
Payer: MEDICAID

## 2022-09-08 NOTE — PROGRESS NOTES
DR MINH LANCASTER OFFICE NOTE 1-4-22                Chief Complaint     6 MONTH F/U    History of Present Illness         45-year-old female status post bilateral nipple sparing mastectomies in April 2021 for left-sided breast cancer, ultimately had positive nipple margin and nipples were excised.  She had issues with expanders and they had to be removed.  She underwent adjuvant chemotherapy, no radiation.  She is set to undergo free flap reconstruction in the near future    Review of Systems         14 point review of systems was performed and was negative except for those pertinent positives and negatives mentioned in the history of present illness    Physical Exam   Vitals & Measurements   HR: 88(Peripheral)  BP: 126/78   HT: 176.00 cm  WT: 73.500 kg  BMI: 23.73           General: Alert and oriented, No acute distress.  Eye: Pupils are equal, round and reactive to light, Extraocular movements are intact, Normal conjunctiva, Vision unchanged.  HENT: Normal hearing, Oral mucosa is moist, No pharyngeal erythema, Ear canals patent, No sinus tenderness.  Neck: Supple, Non-tender, No carotid bruit, No jugular venous distention, No lymphadenopathy, No thyromegaly.  Respiratory: Lungs are clear to auscultation, Respirations are non-labored, Breath sounds are equal, Symmetrical chest wall expansion, No chest wall tenderness.  Cardiovascular: Normal rate, Regular rhythm, No murmur, No gallop, Good pulses equal in all extremities, Normal peripheral perfusion, No edema.  Genitourinary: No costovertebral angle tenderness, No inguinal tenderness, No urethral discharge, No lesions.  Lymphatics: No lymphadenopathy neck, axilla, groin.  Musculoskeletal: Normal range of motion, Normal strength, No tenderness, No swelling, No deformity, Normal gait.  Integumentary: Warm, Pink, Intact, Moist, No pallor, No rash.  Cognition and Speech: Oriented, Speech clear and coherent, Functional cognition intact.   Abdomen: Soft nontender,  nondistended, no palpable masses   Bilateral mastectomy sites without evidence of recurrence    Assessment/Plan           1. CA - Breast cancer C50.919       No evidence of disease   Proceed with reconstruction as planned   Return to clinic in 6 months            Ordered:      Clinic Follow up, *Est. 07/04/22 3:00:00 CDT, Order for future visit, [place] - Breast cancer    Office/Outpatient Visit Level 4 Established 52489 , [place] - Breast cancer, [facility], 01/04/22 9:28:00 CST         Referrals       Clinic Follow up, *Est. 07/04/22 3:00:00 CDT, Order for future visit, [place] - Breast cancer     Problem List/Past Medical History     Ongoing   Benign brain tumor    CA - Breast cancer    Chemotherapy started    History of COVID-19    Historical   No qualifying data    Procedure/Surgical History   Incision & Drainage (Left) (07/13/2021)  Removal of Other Device from Chest Wall, Open Approach (07/13/2021)  Removal of tissue expander without insertion of implant (07/13/2021)  Removal Of Tissue/Skin Expanders (Left) (07/13/2021)  Excision of Chest Subcutaneous Tissue and Fascia, Open Approach (05/03/2021)  Incision & Drainage Breast (Right) (05/03/2021)  Removal of Nonautologous Tissue Substitute from Right Breast, Open Approach (05/03/2021)  Removal of Tissue Expander from Right Breast, Open Approach (05/03/2021)  Biopsy or excision of lymph node(s); open, deep axillary node(s) (04/28/2021)  Excision of cyst, fibroadenoma, or other benign or malignant tumor, aberrant breast tissue, duct lesion, nipple or areolar lesion (except 14994), open, male or female, 1 or more lesions (04/28/2021)  Excision of Left Nipple, Open Approach, Diagnostic (04/28/2021)  Excision of Right Nipple, Open Approach, Diagnostic (04/28/2021)  Intraoperative identification (eg, mapping) of sentinel lymph node(s) includes injection of non-radioactive dye, when performed (List separately in addition to code for primary procedure)  (04/28/2021)  Mastectomy, simple, complete (04/28/2021)  Nipple Graft/Reconstruction (Bilateral) (04/28/2021)  Resection of Bilateral Breast, Open Approach (04/28/2021)  Resection of Bilateral Breast, Open Approach (04/28/2021)  Biopsy or excision of lymph node(s); open, deep axillary node(s) (04/14/2021)  Biopsy or excision of lymph node(s); open, deep axillary node(s) (04/14/2021)  Biopsy or excision of lymph node(s); open, deep axillary node(s) (04/14/2021)  Biopsy Sentinal Node (Bilateral) (04/14/2021)  Excision of Left Axillary Lymphatic, Open Approach, Diagnostic (04/14/2021)  Implant Tissue Expander (Bilateral) (04/14/2021)  Implantation of biologic implant (eg, acellular dermal matrix) for soft tissue reinforcement (ie, breast, trunk) (List separately in addition to code for primary procedure) (04/14/2021)  Implantation of biologic implant (eg, acellular dermal matrix) for soft tissue reinforcement (ie, breast, trunk) (List separately in addition to code for primary procedure) (04/14/2021)  Injection procedure; radioactive tracer for identification of sentinel node (04/14/2021)  Injection procedure; radioactive tracer for identification of sentinel node (04/14/2021)  Insertion of Tissue Expander into Bilateral Breast, Open Approach (04/14/2021)  Intraoperative identification (eg, mapping) of sentinel lymph node(s) includes injection of non-radioactive dye, when performed (List separately in addition to code for primary procedure) (04/14/2021)  Intraoperative identification (eg, mapping) of sentinel lymph node(s) includes injection of non-radioactive dye, when performed (List separately in addition to code for primary procedure) (04/14/2021)  Mastectomy Simple (Bilateral) (04/14/2021)  Mastectomy, simple, complete (04/14/2021)  Mastectomy, simple, complete (04/14/2021)  Mastectomy, simple, complete (04/14/2021)  Planar Nuclear Medicine Imaging of Upper Chest Lymphatics using Other Radionuclide  (04/14/2021)  Planar Nuclear Medicine Imaging of Upper Chest Lymphatics using Technetium 99m (Tc-99m) (04/14/2021)  Reconstruct Breast Bilateral (Bilateral) (04/14/2021)  Resection of Bilateral Breast, Open Approach (04/14/2021)  Supplement Bilateral Breast with Nonautologous Tissue Substitute, Open Approach (04/14/2021)  Tissue expander placement in breast reconstruction, including subsequent expansion(s) (04/14/2021)  left lumpectomy (2014)  neck fusion times 3 levels (2009)  right rotator cuff (2009)  right wrist (2007)  benign tumor removed from chest (1992)  mediport  right eye sx as a child    Medications     anastrozole 1 mg oral tablet, 1 mg= 1 tab(s), Oral, Daily    Allergies     Imitrex (Itching, migraines worsened, vomiting)    Social History       Abuse/Neglect      No, 07/13/2021      No, No, 05/03/2021      No, 04/28/2021      Alcohol      Current, 1-2 times per month, 04/11/2021      Employment/School      Employed, Highest education level: Post graduate degree(s)., 04/11/2021      Exercise      Exercise type: Walking., 04/11/2021      Home/Environment      Lives with Children, Significant other. Living situation: Home/Independent., 04/11/2021            Never in , 04/11/2021      Nutrition/Health      Regular, Good, 04/11/2021      Sexual      Sexually active: Yes. Number of current partners 1. Gender Identity Identifies as female., 04/11/2021      Spiritual/Cultural      Mosque, 04/11/2021      Substance Use      Never, 04/11/2021      Tobacco      Never (less than 100 in lifetime), N/A, 07/13/2021      Never (less than 100 in lifetime), N/A, 05/03/2021      Never (less than 100 in lifetime), No, 04/28/2021            Health Maintenance   Health Maintenance     Pending (in the next year)         OverDue           Influenza Vaccine due  10/01/21  and every 1  day(s)         Due             Alcohol Misuse Screening due  01/02/22  and every 1  year(s)            ADL Screening due   01/04/22  and every 1  year(s)            Cervical Cancer Screening due  01/04/22  Unknown Frequency            Lipid Screening due  01/04/22  Unknown Frequency            Tetanus Vaccine due  01/04/22  and every 10  year(s)         Due In Future             Depression Screening not due until  03/24/22  and every 1  year(s)            Blood Pressure Screening not due until  04/15/22  and every 1  year(s)            Body Mass Index Check not due until  07/12/22  and every 1  year(s)            Obesity Screening not due until  01/01/23  and every 1  year(s)      Satisfied (in the past 1 year)         Satisfied             Blood Pressure Screening on  01/04/22.  Satisfied by Kay Mendenhall            Body Mass Index Check on  01/04/22.  Satisfied by Kay Mendenhall            Depression Screening on  03/24/21.  Satisfied by Patt Baer            Diabetes Screening on  05/02/21.  Satisfied by Chel Beck            Influenza Vaccine on  03/24/21.  Satisfied by Patt Baer            Obesity Screening on  01/04/22.  Satisfied by Kay Mendenhall                         Result type:  Surgery Office/Clinic Note     Result date:  January 04, 2022 9:29 CST     Result status:  Auth (Verified)     Result title:  Office Visit Note     Performed by:  Manav Paz MD on January 04, 2022 9:30 CST     Verified by:  Manav Paz MD on January 04, 2022 9:30 CST     Encounter info:  [id number], [facility], Clinic Visit, 1/4/2022 - 1/4/2022

## 2022-12-06 DIAGNOSIS — R22.2 LOCALIZED SWELLING, MASS AND LUMP, TRUNK: Primary | ICD-10-CM

## 2022-12-06 DIAGNOSIS — Z42.1 ENCOUNTER FOR BREAST RECONSTRUCTION FOLLOWING MASTECTOMY: ICD-10-CM

## 2023-02-23 DIAGNOSIS — M54.2 NECK PAIN: Primary | ICD-10-CM

## 2024-05-31 NOTE — DISCHARGE INSTRUCTIONS
Patient Education       Scar Revision Discharge Instructions     What care is needed at home?   Be sure to wash your hands before and after touching your wound or dressing.   Do not remove your dressings. Keep clean, dry, and intact at all times. Keep black surgical bra on at all times.  You may sponge bathe only. Do not soak in the tub, hot tub, or swim until told by your doctor and your incisions are completely healed. You may shower when told by your doctor.   No heavy lifting anything over 10 pounds. Otherwise, activity as tolerated.  You may resume your regular diet.  Take your prescriptions as ordered by the doctor.  Encourage healing:  Do not drink beer, wine, and mixed drinks (alcohol) for at least 3 to 4 weeks.  Eat a healthy, balanced diet.  Do not swim or use hot tubs for at least 4 weeks.  Do not smoke as this can delay wound healing.  What follow-up care is needed?   Your doctor may ask you to make visits to the office to check on your progress. Be sure to keep these visits.  Will physical activity be limited?   You may have to limit your activity. Talk to your doctor about the right amount of activity for you.  What problems could happen?   Bleeding  Infection  More scarring  Loss of sensation at the revision site  When do I need to call the doctor?   Signs of infection. These include a fever of 100.4°F (38°C) or higher, chills, wound that will not heal, or pain.  Signs of wound infection. These include swelling, redness, warmth around the wound; too much pain when touched; yellowish, greenish, or bloody discharge; foul smell coming from the wound; wound opens up.   Patient Education  Liposuction     What happens after the procedure?   You may feel pain. Your doctor will give you drugs for this.  There will be swelling around the suctioned area. Your doctor will give you a compressive garment to wear over the area for support. You may need to wear this for 4 weeks or more.  There may also be bruising  after the suctioned area. This is normal and will go away on its own after a few days.  Your doctor may leave your cuts open to drain liquid.  What care is needed at home?   There may be bruising around the suctioned area.  The area may be numb or itchy. This will get better.  If the treatment was on your thighs or arms, raise them above the level of the heart when you sit or lie down.  Do not remove your bandaids. Keep clean, dry, and intact at all times.   Sponge bathe only. You may take a shower when released by your doctor.    Do not lift anything over 10 pounds.  Ask your doctor when you may go back to your normal activities like work or driving.  Be sure to wash your hands before and after touching your wound or dressing.  When bathing, remove the compression garment and bandage. Allow soapy water to run down over your wound and then pat dry. Do not scrub or rub at the wound. Replace the bandage with a clean one.  What follow-up care is needed?   Be sure to keep your follow up visit.  If you have staples, you will need to have them taken out. Your doctor will often want to do this in 2 to 3 weeks.  If you plan to have liposuction on other body areas, you will have to wait 3 to 4 weeks.  Talk to your doctor about creams that may stop scarring.  What lifestyle changes are needed?   Exercise. This will help firm the loose skin. Do this when the area is fully healed and when your doctor says it is safe to do so.  Talk to your doctor about the right amount of activity for you.  Avoid activities where you can easily get bumped. Ask your doctor about when and what type of exercise will be good for you to start.  What problems could happen?   Infection  Scarring  Outcome is not as successful as expected  Poor wound healing  Need for liposuction again to achieve the desired look  When do I need to call the doctor?   Signs of infection. These include a fever of 100.4°F (38°C) or higher, chills, wound that will not  heal.  Signs of wound infection. These include swelling, redness, warmth around the wound; too much pain when touched; yellowish, greenish, or bloody discharge; foul smell coming from the cut site; cut site opens up.  Too much blood from the drain site  Very bad pain in the area even after you take the drugs for pain  Trouble breathing or chest pain  Very bad upset stomach and throwing up

## 2024-06-06 ENCOUNTER — HOSPITAL ENCOUNTER (OUTPATIENT)
Facility: HOSPITAL | Age: 49
Discharge: HOME | End: 2024-06-06
Attending: SURGERY | Admitting: SURGERY
Payer: MEDICAID

## 2024-06-06 ENCOUNTER — ANESTHESIA EVENT (OUTPATIENT)
Dept: SURGERY | Facility: HOSPITAL | Age: 49
End: 2024-06-06
Payer: MEDICAID

## 2024-06-06 ENCOUNTER — ANESTHESIA (OUTPATIENT)
Dept: SURGERY | Facility: HOSPITAL | Age: 49
End: 2024-06-06
Payer: MEDICAID

## 2024-06-06 DIAGNOSIS — N65.1 DISPROPORTION OF RECONSTRUCTED BREAST: ICD-10-CM

## 2024-06-06 DIAGNOSIS — N65.0 DEFORMITY OF RECONSTRUCTED BREAST: ICD-10-CM

## 2024-06-06 DIAGNOSIS — Z85.3 PERSONAL HISTORY OF BREAST CANCER: ICD-10-CM

## 2024-06-06 DIAGNOSIS — Z42.1 ENCOUNTER FOR BREAST RECONSTRUCTION FOLLOWING MASTECTOMY: ICD-10-CM

## 2024-06-06 PROCEDURE — 25000003 PHARM REV CODE 250: Performed by: NURSE PRACTITIONER

## 2024-06-06 PROCEDURE — 25000003 PHARM REV CODE 250: Performed by: ANESTHESIOLOGY

## 2024-06-06 PROCEDURE — 36000707: Performed by: SURGERY

## 2024-06-06 PROCEDURE — 36000706: Performed by: SURGERY

## 2024-06-06 PROCEDURE — 27201423 OPTIME MED/SURG SUP & DEVICES STERILE SUPPLY: Performed by: SURGERY

## 2024-06-06 PROCEDURE — D9220A PRA ANESTHESIA: Mod: ANES,,, | Performed by: ANESTHESIOLOGY

## 2024-06-06 PROCEDURE — 63600175 PHARM REV CODE 636 W HCPCS: Performed by: SURGERY

## 2024-06-06 PROCEDURE — 37000009 HC ANESTHESIA EA ADD 15 MINS: Performed by: SURGERY

## 2024-06-06 PROCEDURE — 25000003 PHARM REV CODE 250: Performed by: SURGERY

## 2024-06-06 PROCEDURE — 71000015 HC POSTOP RECOV 1ST HR: Performed by: SURGERY

## 2024-06-06 PROCEDURE — 63600175 PHARM REV CODE 636 W HCPCS: Performed by: ANESTHESIOLOGY

## 2024-06-06 PROCEDURE — 37000008 HC ANESTHESIA 1ST 15 MINUTES: Performed by: SURGERY

## 2024-06-06 PROCEDURE — 11406 EXC TR-EXT B9+MARG >4.0 CM: CPT | Mod: 51,,, | Performed by: SURGERY

## 2024-06-06 PROCEDURE — 71000033 HC RECOVERY, INTIAL HOUR: Performed by: SURGERY

## 2024-06-06 PROCEDURE — 25000003 PHARM REV CODE 250: Performed by: NURSE ANESTHETIST, CERTIFIED REGISTERED

## 2024-06-06 PROCEDURE — 15771 GRFG AUTOL FAT LIPO 50 CC/<: CPT | Mod: ,,, | Performed by: SURGERY

## 2024-06-06 PROCEDURE — 63600175 PHARM REV CODE 636 W HCPCS: Performed by: NURSE ANESTHETIST, CERTIFIED REGISTERED

## 2024-06-06 PROCEDURE — 71000016 HC POSTOP RECOV ADDL HR: Performed by: SURGERY

## 2024-06-06 PROCEDURE — 15772 GRFG AUTOL FAT LIPO EA ADDL: CPT | Mod: 59,,, | Performed by: SURGERY

## 2024-06-06 PROCEDURE — 88305 TISSUE EXAM BY PATHOLOGIST: CPT | Performed by: SURGERY

## 2024-06-06 PROCEDURE — 12036 INTMD RPR S/A/T/EXT 20.1-30: CPT | Mod: 59,,, | Performed by: SURGERY

## 2024-06-06 PROCEDURE — D9220A PRA ANESTHESIA: Mod: CRNA,,, | Performed by: NURSE ANESTHETIST, CERTIFIED REGISTERED

## 2024-06-06 RX ORDER — LIDOCAINE HYDROCHLORIDE 10 MG/ML
INJECTION, SOLUTION EPIDURAL; INFILTRATION; INTRACAUDAL; PERINEURAL
Status: DISCONTINUED | OUTPATIENT
Start: 2024-06-06 | End: 2024-06-06 | Stop reason: HOSPADM

## 2024-06-06 RX ORDER — MUPIROCIN 20 MG/G
OINTMENT TOPICAL 2 TIMES DAILY
OUTPATIENT
Start: 2024-06-06 | End: 2024-06-09

## 2024-06-06 RX ORDER — EPINEPHRINE 1 MG/ML
INJECTION, SOLUTION, CONCENTRATE INTRAVENOUS
Status: DISCONTINUED
Start: 2024-06-06 | End: 2024-06-06 | Stop reason: HOSPADM

## 2024-06-06 RX ORDER — ACETAMINOPHEN 500 MG
1000 TABLET ORAL ONCE
Status: COMPLETED | OUTPATIENT
Start: 2024-06-06 | End: 2024-06-06

## 2024-06-06 RX ORDER — ROCURONIUM BROMIDE 10 MG/ML
INJECTION, SOLUTION INTRAVENOUS
Status: DISCONTINUED | OUTPATIENT
Start: 2024-06-06 | End: 2024-06-06

## 2024-06-06 RX ORDER — PROCHLORPERAZINE EDISYLATE 5 MG/ML
5 INJECTION INTRAMUSCULAR; INTRAVENOUS EVERY 6 HOURS PRN
OUTPATIENT
Start: 2024-06-06

## 2024-06-06 RX ORDER — EPINEPHRINE 0.1 MG/ML
INJECTION INTRAVENOUS
Status: DISCONTINUED | OUTPATIENT
Start: 2024-06-06 | End: 2024-06-06 | Stop reason: HOSPADM

## 2024-06-06 RX ORDER — CEFAZOLIN SODIUM 1 G/3ML
2 INJECTION, POWDER, FOR SOLUTION INTRAMUSCULAR; INTRAVENOUS
Status: COMPLETED | OUTPATIENT
Start: 2024-06-06 | End: 2024-06-06

## 2024-06-06 RX ORDER — MIDAZOLAM HYDROCHLORIDE 2 MG/2ML
2 INJECTION, SOLUTION INTRAMUSCULAR; INTRAVENOUS ONCE AS NEEDED
Status: DISCONTINUED | OUTPATIENT
Start: 2024-06-06 | End: 2024-06-06 | Stop reason: HOSPADM

## 2024-06-06 RX ORDER — LIDOCAINE HYDROCHLORIDE 10 MG/ML
1 INJECTION, SOLUTION EPIDURAL; INFILTRATION; INTRACAUDAL; PERINEURAL ONCE
Status: COMPLETED | OUTPATIENT
Start: 2024-06-06 | End: 2024-06-06

## 2024-06-06 RX ORDER — MEPERIDINE HYDROCHLORIDE 25 MG/ML
12.5 INJECTION INTRAMUSCULAR; INTRAVENOUS; SUBCUTANEOUS ONCE
Status: COMPLETED | OUTPATIENT
Start: 2024-06-06 | End: 2024-06-06

## 2024-06-06 RX ORDER — OXYCODONE AND ACETAMINOPHEN 5; 325 MG/1; MG/1
2 TABLET ORAL EVERY 4 HOURS PRN
Status: DISCONTINUED | OUTPATIENT
Start: 2024-06-06 | End: 2024-06-06 | Stop reason: HOSPADM

## 2024-06-06 RX ORDER — ONDANSETRON HYDROCHLORIDE 2 MG/ML
INJECTION, SOLUTION INTRAMUSCULAR; INTRAVENOUS
Status: DISCONTINUED | OUTPATIENT
Start: 2024-06-06 | End: 2024-06-06

## 2024-06-06 RX ORDER — CEFAZOLIN SODIUM 2 G/50ML
2 SOLUTION INTRAVENOUS
OUTPATIENT
Start: 2024-06-06 | End: 2024-06-07

## 2024-06-06 RX ORDER — LIDOCAINE HYDROCHLORIDE 10 MG/ML
INJECTION INFILTRATION; PERINEURAL
Status: COMPLETED
Start: 2024-06-06 | End: 2024-06-06

## 2024-06-06 RX ORDER — PROPOFOL 10 MG/ML
VIAL (ML) INTRAVENOUS
Status: DISCONTINUED | OUTPATIENT
Start: 2024-06-06 | End: 2024-06-06

## 2024-06-06 RX ORDER — HYDROCODONE BITARTRATE AND ACETAMINOPHEN 5; 325 MG/1; MG/1
1 TABLET ORAL
Status: DISCONTINUED | OUTPATIENT
Start: 2024-06-06 | End: 2024-06-06 | Stop reason: HOSPADM

## 2024-06-06 RX ORDER — MORPHINE SULFATE 4 MG/ML
2 INJECTION, SOLUTION INTRAMUSCULAR; INTRAVENOUS EVERY 4 HOURS PRN
OUTPATIENT
Start: 2024-06-06

## 2024-06-06 RX ORDER — FENTANYL CITRATE 50 UG/ML
INJECTION, SOLUTION INTRAMUSCULAR; INTRAVENOUS
Status: DISCONTINUED | OUTPATIENT
Start: 2024-06-06 | End: 2024-06-06

## 2024-06-06 RX ORDER — SODIUM CHLORIDE 9 MG/ML
INJECTION, SOLUTION INTRAVENOUS CONTINUOUS
OUTPATIENT
Start: 2024-06-06

## 2024-06-06 RX ORDER — GLYCOPYRROLATE 0.2 MG/ML
INJECTION INTRAMUSCULAR; INTRAVENOUS
Status: DISCONTINUED | OUTPATIENT
Start: 2024-06-06 | End: 2024-06-06

## 2024-06-06 RX ORDER — FAMOTIDINE 10 MG/ML
20 INJECTION INTRAVENOUS ONCE
Status: COMPLETED | OUTPATIENT
Start: 2024-06-06 | End: 2024-06-06

## 2024-06-06 RX ORDER — HYDROMORPHONE HYDROCHLORIDE 2 MG/ML
0.2 INJECTION, SOLUTION INTRAMUSCULAR; INTRAVENOUS; SUBCUTANEOUS EVERY 5 MIN PRN
Status: DISCONTINUED | OUTPATIENT
Start: 2024-06-06 | End: 2024-06-06 | Stop reason: HOSPADM

## 2024-06-06 RX ORDER — ONDANSETRON HYDROCHLORIDE 2 MG/ML
4 INJECTION, SOLUTION INTRAVENOUS ONCE
Status: DISCONTINUED | OUTPATIENT
Start: 2024-06-06 | End: 2024-06-06 | Stop reason: HOSPADM

## 2024-06-06 RX ORDER — DEXAMETHASONE SODIUM PHOSPHATE 4 MG/ML
INJECTION, SOLUTION INTRA-ARTICULAR; INTRALESIONAL; INTRAMUSCULAR; INTRAVENOUS; SOFT TISSUE
Status: DISCONTINUED | OUTPATIENT
Start: 2024-06-06 | End: 2024-06-06

## 2024-06-06 RX ORDER — PROMETHAZINE HYDROCHLORIDE 25 MG/ML
INJECTION, SOLUTION INTRAMUSCULAR; INTRAVENOUS
Status: DISCONTINUED | OUTPATIENT
Start: 2024-06-06 | End: 2024-06-06

## 2024-06-06 RX ORDER — PHENYLEPHRINE HYDROCHLORIDE 10 MG/ML
INJECTION INTRAVENOUS
Status: DISCONTINUED | OUTPATIENT
Start: 2024-06-06 | End: 2024-06-06

## 2024-06-06 RX ORDER — CYCLOBENZAPRINE HCL 10 MG
10 TABLET ORAL 3 TIMES DAILY PRN
Status: DISCONTINUED | OUTPATIENT
Start: 2024-06-06 | End: 2024-06-06 | Stop reason: HOSPADM

## 2024-06-06 RX ORDER — ONDANSETRON HYDROCHLORIDE 2 MG/ML
4 INJECTION, SOLUTION INTRAVENOUS EVERY 12 HOURS PRN
OUTPATIENT
Start: 2024-06-06

## 2024-06-06 RX ORDER — METOCLOPRAMIDE HYDROCHLORIDE 5 MG/ML
10 INJECTION INTRAMUSCULAR; INTRAVENOUS EVERY 10 MIN PRN
Status: DISCONTINUED | OUTPATIENT
Start: 2024-06-06 | End: 2024-06-06 | Stop reason: HOSPADM

## 2024-06-06 RX ORDER — METHOCARBAMOL 100 MG/ML
1000 INJECTION, SOLUTION INTRAMUSCULAR; INTRAVENOUS ONCE AS NEEDED
Status: COMPLETED | OUTPATIENT
Start: 2024-06-06 | End: 2024-06-06

## 2024-06-06 RX ORDER — SODIUM CHLORIDE 9 MG/ML
INJECTION, SOLUTION INTRAVENOUS CONTINUOUS
Status: DISCONTINUED | OUTPATIENT
Start: 2024-06-06 | End: 2024-06-06 | Stop reason: HOSPADM

## 2024-06-06 RX ORDER — METOCLOPRAMIDE HYDROCHLORIDE 5 MG/ML
10 INJECTION INTRAMUSCULAR; INTRAVENOUS ONCE
Status: COMPLETED | OUTPATIENT
Start: 2024-06-06 | End: 2024-06-06

## 2024-06-06 RX ORDER — DIPHENHYDRAMINE HYDROCHLORIDE 50 MG/ML
25 INJECTION INTRAMUSCULAR; INTRAVENOUS EVERY 6 HOURS PRN
Status: DISCONTINUED | OUTPATIENT
Start: 2024-06-06 | End: 2024-06-06 | Stop reason: HOSPADM

## 2024-06-06 RX ORDER — BUPIVACAINE HYDROCHLORIDE AND EPINEPHRINE 2.5; 5 MG/ML; UG/ML
INJECTION, SOLUTION EPIDURAL; INFILTRATION; INTRACAUDAL; PERINEURAL
Status: DISCONTINUED
Start: 2024-06-06 | End: 2024-06-06 | Stop reason: WASHOUT

## 2024-06-06 RX ORDER — IPRATROPIUM BROMIDE AND ALBUTEROL SULFATE 2.5; .5 MG/3ML; MG/3ML
3 SOLUTION RESPIRATORY (INHALATION) ONCE AS NEEDED
Status: DISCONTINUED | OUTPATIENT
Start: 2024-06-06 | End: 2024-06-06 | Stop reason: HOSPADM

## 2024-06-06 RX ORDER — HYDROMORPHONE HYDROCHLORIDE 2 MG/ML
0.5 INJECTION, SOLUTION INTRAMUSCULAR; INTRAVENOUS; SUBCUTANEOUS EVERY 5 MIN PRN
Status: DISCONTINUED | OUTPATIENT
Start: 2024-06-06 | End: 2024-06-06 | Stop reason: HOSPADM

## 2024-06-06 RX ADMIN — DEXMEDETOMIDINE HYDROCHLORIDE 4 MCG: 400 INJECTION INTRAVENOUS at 01:06

## 2024-06-06 RX ADMIN — FENTANYL CITRATE 25 MCG: 50 INJECTION, SOLUTION INTRAMUSCULAR; INTRAVENOUS at 02:06

## 2024-06-06 RX ADMIN — PROMETHAZINE HYDROCHLORIDE 6.25 MG: 25 INJECTION INTRAMUSCULAR; INTRAVENOUS at 03:06

## 2024-06-06 RX ADMIN — FENTANYL CITRATE 50 MCG: 50 INJECTION, SOLUTION INTRAMUSCULAR; INTRAVENOUS at 01:06

## 2024-06-06 RX ADMIN — PROPOFOL 175 MG: 10 INJECTION, EMULSION INTRAVENOUS at 12:06

## 2024-06-06 RX ADMIN — METOCLOPRAMIDE 10 MG: 5 INJECTION, SOLUTION INTRAMUSCULAR; INTRAVENOUS at 10:06

## 2024-06-06 RX ADMIN — ONDANSETRON 4 MG: 2 INJECTION INTRAMUSCULAR; INTRAVENOUS at 01:06

## 2024-06-06 RX ADMIN — PHENYLEPHRINE HYDROCHLORIDE 150 MCG: 10 INJECTION INTRAVENOUS at 03:06

## 2024-06-06 RX ADMIN — FAMOTIDINE 20 MG: 10 INJECTION, SOLUTION INTRAVENOUS at 10:06

## 2024-06-06 RX ADMIN — PHENYLEPHRINE HYDROCHLORIDE 100 MCG: 10 INJECTION INTRAVENOUS at 03:06

## 2024-06-06 RX ADMIN — SODIUM CHLORIDE, POTASSIUM CHLORIDE, SODIUM LACTATE AND CALCIUM CHLORIDE: 600; 310; 30; 20 INJECTION, SOLUTION INTRAVENOUS at 12:06

## 2024-06-06 RX ADMIN — DEXMEDETOMIDINE HYDROCHLORIDE 4 MCG: 400 INJECTION INTRAVENOUS at 02:06

## 2024-06-06 RX ADMIN — METHOCARBAMOL 1000 MG: 100 INJECTION INTRAMUSCULAR; INTRAVENOUS at 04:06

## 2024-06-06 RX ADMIN — CEFAZOLIN 2 G: 330 INJECTION, POWDER, FOR SOLUTION INTRAMUSCULAR; INTRAVENOUS at 01:06

## 2024-06-06 RX ADMIN — GLYCOPYRROLATE 0.2 MG: 0.2 INJECTION INTRAMUSCULAR; INTRAVENOUS at 03:06

## 2024-06-06 RX ADMIN — SUGAMMADEX 140 MG: 100 INJECTION, SOLUTION INTRAVENOUS at 03:06

## 2024-06-06 RX ADMIN — FENTANYL CITRATE 50 MCG: 50 INJECTION, SOLUTION INTRAMUSCULAR; INTRAVENOUS at 12:06

## 2024-06-06 RX ADMIN — ROCURONIUM BROMIDE 10 MG: 50 INJECTION INTRAVENOUS at 01:06

## 2024-06-06 RX ADMIN — ROCURONIUM BROMIDE 50 MG: 50 INJECTION INTRAVENOUS at 12:06

## 2024-06-06 RX ADMIN — LIDOCAINE HYDROCHLORIDE 50 MG: 10 INJECTION, SOLUTION EPIDURAL; INFILTRATION; INTRACAUDAL; PERINEURAL at 12:06

## 2024-06-06 RX ADMIN — ACETAMINOPHEN 1000 MG: 500 TABLET ORAL at 10:06

## 2024-06-06 RX ADMIN — CYCLOBENZAPRINE 10 MG: 10 TABLET, FILM COATED ORAL at 07:06

## 2024-06-06 RX ADMIN — OXYCODONE HYDROCHLORIDE AND ACETAMINOPHEN 2 TABLET: 5; 325 TABLET ORAL at 05:06

## 2024-06-06 RX ADMIN — DEXAMETHASONE SODIUM PHOSPHATE 8 MG: 4 INJECTION, SOLUTION INTRA-ARTICULAR; INTRALESIONAL; INTRAMUSCULAR; INTRAVENOUS; SOFT TISSUE at 01:06

## 2024-06-06 RX ADMIN — SODIUM CHLORIDE, POTASSIUM CHLORIDE, SODIUM LACTATE AND CALCIUM CHLORIDE: 600; 310; 30; 20 INJECTION, SOLUTION INTRAVENOUS at 01:06

## 2024-06-06 RX ADMIN — MEPERIDINE HYDROCHLORIDE 12.5 MG: 25 INJECTION INTRAMUSCULAR; INTRAVENOUS; SUBCUTANEOUS at 04:06

## 2024-06-06 NOTE — TRANSFER OF CARE
"Anesthesia Transfer of Care Note    Patient: Kasia Rosales    Procedure(s) Performed: Procedure(s) (LRB):  LIPOSUCTION, WITH FAT TRANSFER (removal of exc ess breast and flap tissue bilaterally, liposuction of axillas, abdomen back and flanks for obtaining fat grafts for bilateral breast fat grafting) (Bilateral)  REVISION, SCAR  //bilateral breast (Bilateral)    Patient location: PACU    Anesthesia Type: general    Transport from OR: Transported from OR on room air with adequate spontaneous ventilation    Post pain: adequate analgesia    Post assessment: no apparent anesthetic complications and tolerated procedure well    Post vital signs: stable    Level of consciousness: responds to stimulation    Nausea/Vomiting: no nausea/vomiting    Complications: none    Transfer of care protocol was followed      Last vitals: Visit Vitals  /74 (Patient Position: Lying)   Pulse 82   Temp 36.5 °C (97.7 °F)   Resp 16   Ht 5' 9" (1.753 m)   Wt 72.3 kg (159 lb 6.3 oz)   SpO2 100%   Breastfeeding No   BMI 23.54 kg/m²     "

## 2024-06-06 NOTE — DISCHARGE SUMMARY
[facility]  Discharge Note  Short Stay    Procedure(s) (LRB):  LIPOSUCTION, WITH FAT TRANSFER (removal of exc ess breast and flap tissue bilaterally, liposuction of axillas, abdomen back and flanks for obtaining fat grafts for bilateral breast fat grafting) (Bilateral)  REVISION, SCAR  //bilateral breast (Bilateral)      OUTCOME: Patient tolerated treatment/procedure well without complication and is now ready for discharge.    DISPOSITION: Home or Self Care    FINAL DIAGNOSIS:  <principal problem not specified>    FOLLOWUP: In clinic    DISCHARGE INSTRUCTIONS:  No discharge procedures on file.     TIME SPENT ON DISCHARGE: 5 minutes

## 2024-06-06 NOTE — ANESTHESIA PROCEDURE NOTES
Intubation    Date/Time: 6/6/2024 12:56 PM    Performed by: Delmy Mckeon CRNA  Authorized by: Sal Lowery DO    Intubation:     Induction:  Intravenous    Intubated:  Postinduction    Mask Ventilation:  Easy with oral airway    Attempts:  1    Attempted By:  CRNA    Method of Intubation:  Direct    Blade:  Wilhelm 2    Laryngeal View Grade: Grade IIA - cords partially seen      Difficult Airway Encountered?: No      Complications:  None    Airway Device:  Oral endotracheal tube    Airway Device Size:  7.0    Style/Cuff Inflation:  Cuffed (inflated to minimal occlusive pressure)    Inflation Amount (mL):  6    Tube secured:  21    Secured at:  The lips    Placement Verified By:  Capnometry    Complicating Factors:  None    Findings Post-Intubation:  BS equal bilateral and atraumatic/condition of teeth unchanged

## 2024-06-06 NOTE — ANESTHESIA PREPROCEDURE EVALUATION
06/06/2024  Kasia Rosales is a 48 y.o., female presents for extensive liposuction and fat transfer.    Other Medical History   Breast cancer Stress incontinence       Surgical History    BREAST LUMPECTOMY MEDIPORT INSERTION, SINGLE   mediport removal MASTECTOMY   TISSUE EXPANDER REMOVAL TISSUE EXPANDER REMOVAL   neck fusion WRIST SURGERY   shoulder surgery KNEE SURGERY   fatty tissue removal MASTECTOMY, RADICAL   RECONSTRUCTION OF BREAST WITH DEEP INFERIOR EPIGASTRIC ARTERY  (KARLEY) FREE FLAP APPENDECTOMY   HYSTERECTOMY Severe PONV       H/H: 9/28    Pre-op Assessment    I have reviewed the Patient Summary Reports.     I have reviewed the Nursing Notes. I have reviewed the NPO Status.   I have reviewed the Medications.     Review of Systems  Anesthesia Hx:  No problems with previous Anesthesia              Personal Hx of Anesthesia complications, Post-Operative Nausea/Vomiting, with every anesthetic, despite treatment , Anesthetics: Ondansetron or equivalent, Decadron, Metoclopramide, Famotidine and Scopolamine patch                   Social:  Non-Smoker           Physical Exam  General: Well nourished, Cooperative, Alert and Oriented    Airway:  Mallampati: II   Mouth Opening: Normal  TM Distance: Normal  Tongue: Normal  Neck ROM: Normal ROM    Dental:  Periodontal disease  Rotted upper teeth with many missing.  Chest/Lungs:  Clear to auscultation, Normal Respiratory Rate    Heart:  Rate: Normal  Rhythm: Regular Rhythm  Sounds: Normal    Abdomen:  Normal, Soft, Nontender        Anesthesia Plan  Type of Anesthesia, risks & benefits discussed:    Anesthesia Type: Gen ETT  Intra-op Monitoring Plan: Standard ASA Monitors  Post Op Pain Control Plan: multimodal analgesia  Induction:  IV  Airway Plan: Direct  Informed Consent: Informed consent signed with the Patient and all parties understand the risks  and agree with anesthesia plan.  All questions answered.   ASA Score: 3  Day of Surgery Review of History & Physical: H&P Update referred to the surgeon/provider.    Ready For Surgery From Anesthesia Perspective.     .

## 2024-06-07 VITALS
RESPIRATION RATE: 16 BRPM | OXYGEN SATURATION: 95 % | TEMPERATURE: 98 F | HEART RATE: 81 BPM | BODY MASS INDEX: 23.6 KG/M2 | DIASTOLIC BLOOD PRESSURE: 84 MMHG | SYSTOLIC BLOOD PRESSURE: 133 MMHG | WEIGHT: 159.38 LBS | HEIGHT: 69 IN

## 2024-06-07 NOTE — ANESTHESIA POSTPROCEDURE EVALUATION
Anesthesia Post Evaluation    Patient: Kasia Rosales    Procedure(s) Performed: Procedure(s) (LRB):  LIPOSUCTION, WITH FAT TRANSFER (removal of exc ess breast and flap tissue bilaterally, liposuction of axillas, abdomen back and flanks for obtaining fat grafts for bilateral breast fat grafting) (Bilateral)  REVISION, SCAR  //bilateral breast (Bilateral)    Final Anesthesia Type: general      Patient location during evaluation: PACU  Patient participation: Yes- Able to Participate  Level of consciousness: awake and alert  Post-procedure vital signs: reviewed and stable  Pain management: adequate  Airway patency: patent  PONCHO mitigation strategies: Multimodal analgesia  PONV status at discharge: No PONV  Anesthetic complications: no      Cardiovascular status: hemodynamically stable  Respiratory status: unassisted  Hydration status: euvolemic  Follow-up not needed.              Vitals Value Taken Time   /85 06/06/24 1843   Temp 36.5 °C (97.7 °F) 06/06/24 1700   Pulse 75 06/06/24 1843   Resp 16 06/06/24 1843   SpO2 96 % 06/06/24 1843         Event Time   Out of Recovery 17:09:00         Pain/Kylie Score: Pain Rating Prior to Med Admin: 7 (6/6/2024  6:13 PM)  Kylie Score: 10 (6/6/2024  5:10 PM)  Modified Kylie Score: 20 (6/6/2024  7:30 PM)

## 2024-06-07 NOTE — OP NOTE
[facility] [address] [place] [address]    PATIENT NAME:      ELLEN BOSS  YOB: 1975  CSN:               [id number]  MRN:               [id number]  ADMIT DATE:        06/06/2024 09:01:00  PHYSICIAN:         Chang Lopez MD                          OPERATIVE REPORT      DATE OF SURGERY:    06/06/2024 00:00:00    SURGEON:  Chang Lopez MD    ASSISTANT:  Jackie Storm, nurse practitioner who was essential in helping   facilitate the procedure and closure.    PROCEDURE:    1. Suction assisted lipectomy of the back, flanks, abdomen and bilateral axillas   to obtain fat graft for fat transfer.  2. Bilateral breast fat transfer.  3. Right breast scar revision x2, measuring approximately 15 cm in length.  4. Left breast scar revision measuring approximately 6 cm in length.    INDICATIONS FOR PROCEDURE:  This is a 48-year-old female.  She has a history of   bilateral mastectomies and subsequent KARLEY flap breast reconstruction.  She has   done very well.  She desired an improvement in the appearance of the flaps as   well as fat transfer to the breasts.  She had some scars that she had been unhappy with.    DESCRIPTION OF PROCEDURE:  The patient was identified in the preoperative   holding area.  She was marked.  She was taken to the operating room.  General   endotracheal anesthesia was provided.  She was placed in supine position,   prepped and draped in a sterile fashion.  A time-out was taken, everyone in   agreement.  We started the procedure by making a total of 5 stab incisions in   the back area.  Through this, we infiltrated tumescent solution and performed   power-assisted liposuction.  The lipoaspirate was being brought into the [employer]   system for processing.  We continued liposuction to the appropriate contour and   then closed the incisions with a 4-0 plain gut suture and  Band-Aids.  The   patient was then flipped over.  We once again prepped and draped in a sterile   fashion.  We infiltrated additional tumescent solution through several stab   incisions in each axilla as well as several stab incisions along her previous   scar in the umbilicus and also in the abdominal area.  After allowing time for   vasoconstriction, we performed additional power-assisted liposuction into the   [employer] system.  After this was accomplished, we then closed these incisions   with 4-0 plain gut suture.  After completion of the liposuction, we obtained a   total of 540 mL of fat that would be suitable for grafting.  We used the areas    for scar revision.  We excised the skin on the right and left breasts and   through these incisions, we then performed fat transfer using cannulas.  After   transferring the fat in small syringes, approximately 300 mL of fat was   transferred to the right breast, 240 mL of fat was transferred to the left   breast.  We then proceeded with closure of these incisions with 0 Vicryl sutures   in the deep dermis, subcutaneous tissue, 2-0 subcuticular Stratafix.  Dermabond   tape was used on these incisions.  The patient tolerated the procedure well.    There were no complications.  All counts were correct.        ______________________________  MD MARY GRACE Mora/OSKAR  DD:  06/06/2024  Time:  06:01PM  DT:  06/06/2024  Time:  10:06PM  Job #:  [id number]      OPERATIVE REPORT

## 2024-06-07 NOTE — PLAN OF CARE
Discharge instructions given to pt, pt's family, verbalized readiness for discharge, no acute distress, VSS

## 2024-06-10 LAB — PSYCHE PATHOLOGY RESULT: NORMAL

## 2024-07-03 ENCOUNTER — LAB REQUISITION (OUTPATIENT)
Dept: LAB | Facility: HOSPITAL | Age: 49
End: 2024-07-03
Payer: MEDICAID

## 2024-07-03 DIAGNOSIS — T81.49XA INFECTION FOLLOWING A PROCEDURE, OTHER SURGICAL SITE, INITIAL ENCOUNTER: ICD-10-CM

## 2024-07-03 PROCEDURE — 87077 CULTURE AEROBIC IDENTIFY: CPT | Performed by: SURGERY

## 2024-07-03 PROCEDURE — 87070 CULTURE OTHR SPECIMN AEROBIC: CPT | Performed by: SURGERY

## 2024-07-05 LAB — BACTERIA WND CULT: ABNORMAL

## (undated) DEVICE — GLOVE SIGNATURE MICRO LTX 6.5

## (undated) DEVICE — GLOVE SENSICARE PI SURG 7

## (undated) DEVICE — BANDAGE GAUZE COT STRL 4.5X4.1

## (undated) DEVICE — Device

## (undated) DEVICE — SYS CLSR DERMABOND PRINEO 22CM

## (undated) DEVICE — DRAPE TOP 53X102IN

## (undated) DEVICE — SUT CTD VICRYL 0 UND BR

## (undated) DEVICE — DRAPE ORTH SPLIT 77X108IN

## (undated) DEVICE — SPONGE COTTON TRAY 4X4IN

## (undated) DEVICE — BLANKET SNUGGLE WARM LOWER BDY

## (undated) DEVICE — APPLICATOR CHLORAPREP ORN 26ML

## (undated) DEVICE — SURGICAL BRA

## (undated) DEVICE — TUBING INFILTRATION SNG SPIKE

## (undated) DEVICE — COVER PROXIMA MAYO STAND

## (undated) DEVICE — SPONGE LAP 18X18 PREWASHED

## (undated) DEVICE — GLOVE SENSICARE PI GRN 6.5

## (undated) DEVICE — PAD ABDOMINAL STERILE 8X10IN

## (undated) DEVICE — BLADE SURG STAINLESS STEEL #10

## (undated) DEVICE — SUPPORT ULNA NERVE PROTECTOR

## (undated) DEVICE — COVER TABLE HVY DTY 60X90IN

## (undated) DEVICE — DRAPE INCISE IOBAN 2 13X13IN

## (undated) DEVICE — COUNT NDL FOAM MAGNET 40COUNT

## (undated) DEVICE — SUT GUT PL. 4-0 27 FS-2

## (undated) DEVICE — SUT STRATAFIX MCRYL 27IN 2-0

## (undated) DEVICE — TUBE PAL ASPIR CONN STRL 12FT

## (undated) DEVICE — DRAPE UTILITY W/ TAPE 20X30IN

## (undated) DEVICE — ELECTRODE BLADE INSULATED 1 IN

## (undated) DEVICE — SOL NACL IRR 1000ML BTL

## (undated) DEVICE — COVER HANDLE LIGHT RIGID

## (undated) DEVICE — GLOVE SIGNATURE MICRO LTX 8

## (undated) DEVICE — SOL LAC RINGERS 1000ML INJ

## (undated) DEVICE — ELECTRODE PATIENT RETURN DISP

## (undated) DEVICE — TOWEL OR DISP STRL BLUE 4/PK

## (undated) DEVICE — NDL SYR 10ML 18X1.5 LL BLUNT

## (undated) DEVICE — HEADREST DERMAPROX PED 7IN

## (undated) DEVICE — SOL NORMAL USPCA 0.9%

## (undated) DEVICE — SYS REVOLVE FAT PROCESSING

## (undated) DEVICE — SYR 50ML CATH TIP

## (undated) DEVICE — SYR ONLY LUER LOCK 20CC

## (undated) DEVICE — BINDER ABDOM 4PANEL 12IN SM/MD

## (undated) DEVICE — NDL ECLIPSE SAFETY 23G 1.5IN

## (undated) DEVICE — STAPLER SKIN ROTATING HEAD